# Patient Record
Sex: MALE | Race: WHITE | Employment: OTHER | URBAN - METROPOLITAN AREA
[De-identification: names, ages, dates, MRNs, and addresses within clinical notes are randomized per-mention and may not be internally consistent; named-entity substitution may affect disease eponyms.]

---

## 2020-11-27 ENCOUNTER — HOSPITAL ENCOUNTER (EMERGENCY)
Age: 75
Discharge: HOME | End: 2020-11-27
Payer: MEDICARE

## 2020-11-27 VITALS
RESPIRATION RATE: 18 BRPM | WEIGHT: 219 LBS | TEMPERATURE: 97 F | OXYGEN SATURATION: 98 % | DIASTOLIC BLOOD PRESSURE: 80 MMHG | HEART RATE: 47 BPM | SYSTOLIC BLOOD PRESSURE: 134 MMHG

## 2020-11-27 PROCEDURE — 2500000003 HC RX 250 WO HCPCS: Performed by: PHYSICIAN ASSISTANT

## 2020-11-27 PROCEDURE — 90471 IMMUNIZATION ADMIN: CPT | Performed by: PHYSICIAN ASSISTANT

## 2020-11-27 PROCEDURE — 90715 TDAP VACCINE 7 YRS/> IM: CPT | Performed by: PHYSICIAN ASSISTANT

## 2020-11-27 PROCEDURE — 6360000002 HC RX W HCPCS: Performed by: PHYSICIAN ASSISTANT

## 2020-11-27 PROCEDURE — 99213 OFFICE O/P EST LOW 20 MIN: CPT

## 2020-11-27 PROCEDURE — 96372 THER/PROPH/DIAG INJ SC/IM: CPT

## 2020-11-27 PROCEDURE — 12001 RPR S/N/AX/GEN/TRNK 2.5CM/<: CPT

## 2020-11-27 RX ORDER — ATENOLOL 25 MG/1
25 TABLET ORAL DAILY
COMMUNITY

## 2020-11-27 RX ORDER — TAMSULOSIN HYDROCHLORIDE 0.4 MG/1
0.4 CAPSULE ORAL DAILY
COMMUNITY

## 2020-11-27 RX ORDER — LIDOCAINE HYDROCHLORIDE 10 MG/ML
20 INJECTION, SOLUTION INFILTRATION; PERINEURAL ONCE
Status: COMPLETED | OUTPATIENT
Start: 2020-11-27 | End: 2020-11-27

## 2020-11-27 RX ORDER — CEPHALEXIN 500 MG/1
500 CAPSULE ORAL 4 TIMES DAILY
Qty: 12 CAPSULE | Refills: 0 | Status: SHIPPED | OUTPATIENT
Start: 2020-11-27 | End: 2020-11-30

## 2020-11-27 RX ORDER — SIMVASTATIN 10 MG
10 TABLET ORAL NIGHTLY
COMMUNITY

## 2020-11-27 RX ORDER — FLECAINIDE ACETATE 50 MG/1
50 TABLET ORAL 2 TIMES DAILY
COMMUNITY

## 2020-11-27 RX ADMIN — TETANUS TOXOID, REDUCED DIPHTHERIA TOXOID AND ACELLULAR PERTUSSIS VACCINE, ADSORBED 0.5 ML: 5; 2.5; 8; 8; 2.5 SUSPENSION INTRAMUSCULAR at 13:18

## 2020-11-27 RX ADMIN — LIDOCAINE HYDROCHLORIDE 20 ML: 10 INJECTION, SOLUTION INFILTRATION; PERINEURAL at 13:18

## 2020-11-27 ASSESSMENT — PAIN SCALES - GENERAL
PAINLEVEL_OUTOF10: 0
PAINLEVEL_OUTOF10: 1

## 2020-11-27 ASSESSMENT — PAIN DESCRIPTION - PAIN TYPE: TYPE: ACUTE PAIN

## 2020-11-27 ASSESSMENT — PAIN DESCRIPTION - LOCATION: LOCATION: FINGER (COMMENT WHICH ONE)

## 2020-11-27 ASSESSMENT — PAIN DESCRIPTION - DESCRIPTORS: DESCRIPTORS: BURNING

## 2020-11-27 NOTE — ED PROVIDER NOTES
[facility]     Department of Emergency Medicine   ED  Provider Note  Admit Date/RoomTime: 11/27/2020 12:26 PM  ED Room: 06/06     Chief Complaint   Laceration (Pt c/o cutting his L index finger finger on a saw today)    History of Present Illness      Mari Pinon is a 76 y.o. old male presenting to the emergency department for a laceration to the left index finger caused by . He states he was cutting metal when the  kicked back onto his hand. He has 3.5cm laceration to dorsal aspect of left index finger with no active bleeding at this time. He has full ROM of affected finger with intact sensations distally. Patient will be updated on tetanus shot at today's exam. He denies any other complaint or concern at this ED visit. Patient is alert and oriented x3 and in no apparent distress. The patients tetanus status is unknown. ROS   Pertinent positives and negatives are stated within HPI, all other systems reviewed and are negative. Past Medical History:  has a past medical history of Atrial fibrillation ([address] and Cancer ([facility] [address] Past Surgical History:  has a past surgical history that includes hernia repair and back surgery. Social History:  reports that he has quit smoking. He has never used smokeless tobacco.    Family History: family history is not on file. Allergies: Quinidine and Ranexa [ranolazine er]  Allergies reviewed with patient     Physical Exam   VS:  /80   Pulse (!) 47   Temp 97 °F (36.1 °C) (Infrared)   Resp 18   Wt 219 lb (99.3 kg)   SpO2 98%      Oxygen Saturation Interpretation: Normal.    Constitutional:  Alert and oriented x4, development consistent with age. HEENT:  NC/NT. Airway patent. PERRLA, EOMI   Neck:  Normal ROM. Supple. Non-tender.   Extremities: 3.5cm laceration to dorsal aspect of left index finger, no active bleeding, full ROM, intact sensations distally, no FB or tendon injury   Lymphatics: No lymphangitis or adenopathy noted.  Neurological: CN II-XII grossly intact, Motor functions intact. Lab / Imaging Results   (All laboratory and radiology results have been personally reviewed by myself)  Labs:  No results found for this visit on 11/27/20. Imaging: All Radiology results interpreted by Radiologist unless otherwise noted. No orders to display     ED Course / Medical Decision Making     Medications   lidocaine 1 % injection 20 mL (20 mLs Intradermal Given 11/27/20 1318)   Tetanus-Diphth-Acell Pertussis (BOOSTRIX) injection 0.5 mL (0.5 mLs Intramuscular Given 11/27/20 1318)     Consult(s):  None    Procedure(s):  PROCEDURE NOTE      LACERATION REPAIR  Risks, benefits and alternatives (for applicable procedures below) described. Performed By: Del Story PA-C. Laceration #: 1. Location: left index finger   Length: 3.5cm. The wound area was irrigated with sterile saline, cleansed with povidone iodine, cleansend with shur-clens and draped in a sterile fashion. Local Anesthesia:  Lidocaine 1% without epinephrine. The wound was explored with the following results:  no foreign body or tendon injury seen. Flaps Aligned: yes. The wound was closed with 5-0 Ethilon using interrupted sutures. Dressing:  bacitracin and a sterile dressing was placed. Total number suture:  7. There were no additional lacerations requiring repair. MDM:     Counseling: The emergency provider has spoken with the patient/caregiver and discussed todays results, in addition to providing specific details for the plan of care and counseling regarding the diagnosis and prognosis. Questions are answered at this time and they are agreeable with the plan. Pt was educated on signs of infection and wound care. Pt was advised to return to ED if signs of infection or fever develop. Pt understands they must follow-up with PCP for suture removal and/or wound check. Pt remained nontoxic, afebrile, and A&O x3 during this ED visit.  They agreed with plan of care, discharge, and importance of follow-up. Pt was in no distress at discharge. Vitals stable. All results reviewed with pt and all questions answered. Patient was educated on newly prescribed medication. Patient educated on wound care. Assessment     1. Laceration of left index finger without foreign body without damage to nail, initial encounter      Plan   Discharge to home  Patient condition is good    New Medications     Discharge Medication List as of 11/27/2020  1:40 PM      START taking these medications    Details   cephALEXin (KEFLEX) 500 MG capsule Take 1 capsule by mouth 4 times daily for 3 days, Disp-12 capsule,R-0Print             Electronically signed by Dennis Keller PA-C   DD: 11/27/20  **This report was transcribed using voice recognition software. Every effort was made to ensure accuracy; however, inadvertent computerized transcription errors may be present.     END OF ED PROVIDER NOTE        Dennis Keller PA-C  11/27/20 9218